# Patient Record
Sex: FEMALE | Race: ASIAN | NOT HISPANIC OR LATINO | Employment: FULL TIME | URBAN - METROPOLITAN AREA
[De-identification: names, ages, dates, MRNs, and addresses within clinical notes are randomized per-mention and may not be internally consistent; named-entity substitution may affect disease eponyms.]

---

## 2024-10-31 ENCOUNTER — TELEPHONE (OUTPATIENT)
Age: 49
End: 2024-10-31

## 2024-10-31 NOTE — TELEPHONE ENCOUNTER
I tried to call pt to inform them I had to change the time of their appt due to provider schedule change. New information is 1/13 at 4:20 pm. I ask them to call and confirm or change appt if it doesn't work.

## 2025-01-06 ENCOUNTER — TELEPHONE (OUTPATIENT)
Age: 50
End: 2025-01-06

## 2025-01-06 NOTE — TELEPHONE ENCOUNTER
I tried to call pt as the insurance is coming up as e-rejected. I let them know they can give us a call to update it or they might want [email or web address] call insurance company to find why it is e-rejected.